# Patient Record
Sex: MALE | Race: WHITE | NOT HISPANIC OR LATINO
[De-identification: names, ages, dates, MRNs, and addresses within clinical notes are randomized per-mention and may not be internally consistent; named-entity substitution may affect disease eponyms.]

---

## 2017-08-08 ENCOUNTER — RECORDS - HEALTHEAST (OUTPATIENT)
Dept: ADMINISTRATIVE | Facility: OTHER | Age: 62
End: 2017-08-08

## 2017-10-04 ENCOUNTER — RECORDS - HEALTHEAST (OUTPATIENT)
Dept: ADMINISTRATIVE | Facility: OTHER | Age: 62
End: 2017-10-04

## 2017-10-05 ENCOUNTER — RECORDS - HEALTHEAST (OUTPATIENT)
Dept: ADMINISTRATIVE | Facility: OTHER | Age: 62
End: 2017-10-05

## 2017-10-06 ENCOUNTER — RECORDS - HEALTHEAST (OUTPATIENT)
Dept: ADMINISTRATIVE | Facility: OTHER | Age: 62
End: 2017-10-06

## 2017-10-09 ENCOUNTER — RECORDS - HEALTHEAST (OUTPATIENT)
Dept: ADMINISTRATIVE | Facility: OTHER | Age: 62
End: 2017-10-09

## 2017-10-10 ENCOUNTER — RECORDS - HEALTHEAST (OUTPATIENT)
Dept: ADMINISTRATIVE | Facility: OTHER | Age: 62
End: 2017-10-10

## 2017-10-12 ENCOUNTER — RECORDS - HEALTHEAST (OUTPATIENT)
Dept: ADMINISTRATIVE | Facility: OTHER | Age: 62
End: 2017-10-12

## 2017-10-24 ENCOUNTER — RECORDS - HEALTHEAST (OUTPATIENT)
Dept: ADMINISTRATIVE | Facility: OTHER | Age: 62
End: 2017-10-24

## 2017-10-24 ENCOUNTER — AMBULATORY - HEALTHEAST (OUTPATIENT)
Dept: RADIATION ONCOLOGY | Age: 62
End: 2017-10-24

## 2017-10-24 DIAGNOSIS — C79.31 BRAIN METASTASIS: ICD-10-CM

## 2017-10-26 ENCOUNTER — RECORDS - HEALTHEAST (OUTPATIENT)
Dept: ADMINISTRATIVE | Facility: OTHER | Age: 62
End: 2017-10-26

## 2017-10-30 ENCOUNTER — COMMUNICATION - HEALTHEAST (OUTPATIENT)
Dept: TELEHEALTH | Facility: CLINIC | Age: 62
End: 2017-10-30

## 2017-10-30 ENCOUNTER — HOSPITAL ENCOUNTER (OUTPATIENT)
Dept: CT IMAGING | Facility: CLINIC | Age: 62
Setting detail: RADIATION/ONCOLOGY SERIES
Discharge: STILL ADMITTED OR ADMINISTRATIVE | End: 2017-10-30
Attending: RADIOLOGY

## 2017-10-30 ENCOUNTER — OFFICE VISIT - HEALTHEAST (OUTPATIENT)
Dept: RADIATION ONCOLOGY | Age: 62
End: 2017-10-30

## 2017-10-30 ENCOUNTER — HOSPITAL ENCOUNTER (OUTPATIENT)
Dept: MRI IMAGING | Facility: CLINIC | Age: 62
Discharge: HOME | End: 2017-10-30
Attending: RADIOLOGY

## 2017-10-30 DIAGNOSIS — C79.31 BRAIN METASTASES: ICD-10-CM

## 2017-10-30 DIAGNOSIS — C79.31 BRAIN METASTASIS: ICD-10-CM

## 2017-10-30 RX ORDER — ACETAMINOPHEN 500 MG
500-1000 TABLET ORAL EVERY 6 HOURS PRN
Status: SHIPPED | COMMUNITY
Start: 2017-10-06

## 2017-10-30 RX ORDER — HYDROCODONE BITARTRATE AND ACETAMINOPHEN 5; 325 MG/1; MG/1
1-2 TABLET ORAL EVERY 4 HOURS PRN
Status: SHIPPED | COMMUNITY
Start: 2017-10-24

## 2017-10-30 RX ORDER — DEXAMETHASONE 2 MG/1
2 TABLET ORAL 2 TIMES DAILY WITH MEALS
Qty: 60 TABLET | Refills: 0 | Status: SHIPPED | OUTPATIENT
Start: 2017-10-30

## 2017-10-30 RX ORDER — ALLOPURINOL 300 MG/1
300 TABLET ORAL DAILY
Status: SHIPPED | COMMUNITY
Start: 2017-10-30

## 2017-10-30 RX ORDER — ALBUTEROL SULFATE 90 UG/1
2 AEROSOL, METERED RESPIRATORY (INHALATION) EVERY 4 HOURS PRN
Status: SHIPPED | COMMUNITY
Start: 2017-03-30

## 2017-10-30 ASSESSMENT — MIFFLIN-ST. JEOR: SCORE: 1978.78

## 2017-10-31 ENCOUNTER — AMBULATORY - HEALTHEAST (OUTPATIENT)
Dept: RADIATION ONCOLOGY | Facility: HOSPITAL | Age: 62
End: 2017-10-31

## 2017-10-31 DIAGNOSIS — C79.31 BRAIN METASTASES: ICD-10-CM

## 2017-11-02 ENCOUNTER — AMBULATORY - HEALTHEAST (OUTPATIENT)
Dept: RADIATION ONCOLOGY | Age: 62
End: 2017-11-02

## 2017-11-03 ENCOUNTER — OFFICE VISIT - HEALTHEAST (OUTPATIENT)
Dept: RADIATION ONCOLOGY | Age: 62
End: 2017-11-03

## 2017-11-03 ENCOUNTER — AMBULATORY - HEALTHEAST (OUTPATIENT)
Dept: RADIATION ONCOLOGY | Age: 62
End: 2017-11-03

## 2017-11-03 DIAGNOSIS — C64.9 CLEAR CELL ADENOCARCINOMA OF KIDNEY, UNSPECIFIED LATERALITY: ICD-10-CM

## 2017-11-03 DIAGNOSIS — C79.31 BRAIN METASTASIS: ICD-10-CM

## 2017-11-03 DIAGNOSIS — C79.31 BRAIN METASTASES: ICD-10-CM

## 2017-11-13 ENCOUNTER — COMMUNICATION - HEALTHEAST (OUTPATIENT)
Dept: RADIATION ONCOLOGY | Age: 62
End: 2017-11-13

## 2017-11-15 ENCOUNTER — COMMUNICATION - HEALTHEAST (OUTPATIENT)
Dept: RADIATION ONCOLOGY | Age: 62
End: 2017-11-15

## 2018-10-17 ENCOUNTER — COMMUNICATION - HEALTHEAST (OUTPATIENT)
Dept: RADIATION ONCOLOGY | Facility: HOSPITAL | Age: 63
End: 2018-10-17

## 2021-05-31 VITALS — WEIGHT: 264.2 LBS | BODY MASS INDEX: 39.13 KG/M2 | HEIGHT: 69 IN

## 2021-06-13 NOTE — PROGRESS NOTES
[facility] Radiation Oncology Consult Note    Patient: Jeramy Carson  MRN: [id number]  Date of Service: 10/30/2017    Assessment / Impression     1. Brain metastases               Plan:   Patient to meet with Dr. Reina and complete CT Simulation and MRI today for treatment planning.   Order in to refill dexamethasone. Benadryl to help with rash.    Genetic testing due to multiple tumors both with patient and his family.     Face to face time  60 minutes with > 75% spent on consultation, education and coordination of care.  Intent of Therapy: Palliative  Side effects that may occur during or within weeks after Radiation Therapy      Fatigue and general weakness    Headache and scalp irritation    Loss of hair    Nausea, vomiting, and decrease in appetite    Darkening, irritation, itchiness, redness, dryness, peeling, thickening, scabbing, and ulceration of the scalp, neck and forehead    Side effects that may occur months or years after Radiation Therapy      Development of another tumor or cancer           Dizziness and balance problems    Decrease in hormone production    Brain inflammation or necrosis that may cause various neurologic symptoms    Seizures    Decrease in memory and thinking abilities    Decrease in hearing and feeling of ear congestion    Eye dryness and irritation    Loss of vision    Cataracts in the eyes    Poor healing after a trauma or surgery in the irradiated area    Facial numbness, pain and weakness    The risks, benefits and alternatives to radiation therapy were outlined with the patient. All questions were answered and a consent was signed.        Subjective:      HPI: Jeramy Carson is a 62 y.o. male referred for CyberKnife treatment for metastatic renal carcinoma to the left occipital s/p resection 10/9/2017.      4/1/2016 patient presented with new pulmonary nodules in the right upper lobe that were biopsied.  Consistent with well-differentiated neuroendocrine tumor.  8/2017  imaging showed that pulmonary nodules were increasing and patient was started on Octreotide.    Jeramy originally presented to the [facility] room 10/5/2017 after several weeks of blurred vision, subtle confusion, and a feeling that things were not quite right.  CT scan showed a 2 cm left occipital mass with associated edema.  He started Decadron and his condition improved per his son.    Dr. Odom performed a STEALTH-GUIDED LEFT OCCIPITAL CRANIECTOMY, RESECTION OF MASS, MICRODISSECTION, MESH CRANIOPLASTY for a TGR of the tumor.  Path showed metastatic renal cell.  Post operative scan showed complete resection with a Subtle nodular enhancement anterior to the resection cavity, best seen on S12:12-14. Post operative course uneventful.     PET scan showed a abdominal mass measuring 4.3 cm.  This will be biopsied later this week.  He has a substantial family history of cancer which needs to be worked up.        Chief Complaint   Patient presents with     HE Cancer     Prior Radiation: No  Concurrent Chemotherapy: No    Current Outpatient Prescriptions   Medication Sig Dispense Refill     acetaminophen (TYLENOL) 500 MG tablet Take 500-1,000 mg by mouth every 6 (six) hours as needed.       albuterol (PROAIR HFA;PROVENTIL HFA;VENTOLIN HFA) 90 mcg/actuation inhaler Inhale 2 puffs every 4 (four) hours as needed.       allopurinol (ZYLOPRIM) 300 MG tablet Take 300 mg by mouth daily.       dexamethasone (DECADRON) 2 MG tablet Take 2 mg by mouth 2 (two) times a day with meals.        diphenhydrAMINE (BENADRYL) 25 mg tablet Take 25 mg by mouth 2 (two) times a day.       famotidine (PEPCID) 20 MG tablet Take 20 mg by mouth 2 (two) times a day.       HYDROcodone-acetaminophen 5-325 mg per tablet Take 1-2 tablets by mouth every 4 (four) hours as needed.       octreotide (SANDOSTATIN) 20 mg injection Inject 20 mg into the shoulder, thigh, or buttocks every 28 days.       No current facility-administered medications for  this visit.      Past Medical History:   Diagnosis Date     Brain metastasis      Obesity      Obstructive sleep apnea      Renal cell carcinoma      Past Surgical History:   Procedure Laterality Date     BACK SURGERY  2005     CRANIOTOMY  10/2017     NEPHRECTOMY  2015     Review of patient's allergies indicates no known allergies.  History reviewed. No pertinent family history.  Social History     Social History     Marital status: Single     Spouse name: N/A     Number of children: N/A     Years of education: N/A     Occupational History     Not on file.     Social History Main Topics     Smoking status: Never Smoker     Smokeless tobacco: Never Used     Alcohol use Yes      Comment: 4/week     Drug use: No     Sexual activity: Not on file     Other Topics Concern     Not on file     Social History Narrative     No narrative on file        Review of Systems:        General  General (WDL): Exceptions to WDL  Fatigue: Yes - Recent (Less than 3 months)  Weight Loss: Yes - Recent (Greater than 3 months) (20 pounds)  Night Sweats: Yes - Recent (Greater than 3 months)  EENT  ENT (WDL): Exceptions to WDL  Hoarseness: Yes - Recent (Less than 3 months)  Sore Throat: Yes - Recent (Less than 3 months)  Respiratory       Respiratory (WDL): All respiratory elements are within defined limits  Cardiovascular  Cardiovascular (WDL): All cardiovascular elements are within defined limits  Endocrine  Endocrine (WDL): Exceptions to WDL  Hotflashes: Yes -Recent (Less than 3 months)  Gastrointestinal  Gastrointestinal (WDL): Exceptions to WDL  Diarrhea: Yes - Recent (Less than 3 months)  Have had black or tan stools?: Yes - Recent (Less than 3 months)  Musculoskeletal  Musculoskeletal (WDL): All musculoskeletal elements are within defined limits  Integumentary                  Neurological     Psychological/Emotional      Hematological/Lymphatic     Dermatologic     Genitourinary/Reproductive     Reproductive     Pain                "  Accompanied by         Objective:     Physical Exam    Vitals:    10/30/17 0908   BP: 119/83   Pulse: 68   SpO2: 98%   Weight: (!) 264 lb 3.2 oz (119.8 kg)   Height: 5' 9\" (1.753 m)       GENERAL: no acute distress. Cooperative in conversation.   HEENT: pupils are equal, round and reactive. Oromucosa is clean and intact. No ulcerations or mucositis noted. No bleeding noted.   RESP: lungs are clear bilaterally per auscultation. Regular respiratory rate. No wheezes or rhonchi.  CV: Regular, rate and rhythm. No murmurs.  ABD: soft, nontender.  MUSCULOSKELETAL: no palpable points of tenderness   PSYCH: within normal limits. No depression or anxiety.  SKIN: incision dry and intact, macular rash centered around subclavian  LYMPH: no cervical, supraclavicular lymphadenopathy  EXTREMITIES: no edema   NEUROLOGIC: CNII-XII symmetric, normal strength, sensation and reflexes     Throughout, gait normal, visual fields intact     Recent Labs: No results found for this or any previous visit (from the past 168 hour(s)).    Imaging: EXAM:  PET/CT EYES TO THIGHS, CANCER INITIAL STAGING     CLINICAL INFORMATION:  62-year-old male presents with neuroendocrine carcinoma metastatic to   lung, metastatic kidney cancer to the brain, also has carcinoid.     COMPARISON:  CT chest 8/7/2017.  CT abdomen and pelvis 2/2/2017.     TECHNICAL INFORMATION:  After the IV administration of 18 mCi F18-FDG patient rested for   approximately one hour in a sensory deprived environment.  Tomographic images were obtained of   the  neck, chest, abdomen, pelvis and upper thighs (eyes to thighs)  with coronal, axial and   sagittal reformats.  These images were fused with a concurrently acquired computed tomography   (CT) scan for attenuation correction and anatomical localization.      Patient's blood glucose level at the time of injection is 109.     INTERPRETATION:       Average liver SUV: 3.18     Head and Neck:      Physiologic intracranial metabolic " activity is present with some misregistration.     No enlarged cervical lymph node or neck mass is present exhibiting increased FDG uptake.     Chest:      There is apparent minimal increase in size of right middle lobe nodule now measuring 2.2 x 1.7   cm and exhibiting increased FDG uptake with SUV of 11.24.     There is increased size of left upper lobe irregularly-shaped mass now measuring approximately   3.5 cm x 1.9 cm exhibiting FDG uptake with SUV of 11.37.     6 mm soft tissue round structure is present just above the left shoulder exhibiting increased   FDG uptake with SUV of 19.06 (CT image 53).     Physiologic myocardial activity is present.     No enlarged mediastinal, hilar or axillary lymph nodes are present exhibiting increased FDG   uptake.     Abdomen, Pelvis and Proximal Thighs:      Liver is normal in size.  There is a small area at the lateral aspect of the right hepatic lobe   exhibiting markedly increased FDG uptake with SUV of 14.97.     Spleen, pancreas, left adrenal gland and left kidney are normal in size.     There is physiologic left  and GI uptake.     There is a mass measuring 4.5 cm x 3 cm at the left mid abdominal area exhibiting increased FDG   uptake with SUV of 13.36 (CT image 149).  Adjacent mesenteric lymph nodes are present   exhibiting increased FDG uptake with SUV of 8.64 (CT image 145) and 7.54 (CT image 143).     No enlarged retroperitoneal lymph node, mesenteric lymph node or pelvic lymph node is present   exhibiting increased FDG uptake.     There is a small round structure measuring 0.8 x 0.7 cm within the subcutaneous fat lateral to   the right scapula exhibiting increased FDG uptake with SUV of 3.26 (CT image 101).     There is a round structure measuring 1.3 cm x 1.1 cm within the subcutaneous fat of the left   anterior abdominal wall exhibiting increased FDG uptake with SUV of 6.39 (CT image 138).     There is a small round structure measuring 0.8 x 0.6 cm within  the subcutaneous fat of the mid   anterior abdominal wall exhibiting minimal increased FDG uptake with SUV of 1.55 (CT image   174).     There is a round structure measuring 1 cm x 0.7 cm within the subcutaneous fat of the right   side of the anterior abdominal wall exhibiting increased FDG uptake with SUV of 5.92 (CT image   190).     There is a round structure measuring 1 cm x 1 cm at the right inguinal area exhibiting   increased FDG uptake with SUV of 4.53 (CT image 205).     There is a round structure within the subcutaneous fat at the posterior aspect of the left   upper thigh area exhibiting marked increased FDG uptake with SUV of 14.3.     IMPRESSION:     1.  Metabolically active right middle lobe nodule and left upper lobe mass.     2.  Large metabolically active left abdominal mass with adjacent smaller metabolically active   lymph nodes.     3.  Possible metabolically active lesion at the lateral aspect of the right hepatic lobe.    Recommend ultrasound correlation.       4.  Several scattered metabolically active small subcutaneous nodules as described which may   represent metastatic lesions.     RRD:michelle     Read By: Morteza Taylor M.D.     MRI HEAD WITHOUT AND WITH CONTRAST    INDICATION:  assess for residual mass    TECHNIQUE:  Multiplanar, multisequence magnetic resonance imaging of the brain is performed  without and with IV contrast administration.    CONTRAST:  Dotarem 20 mL.    COMPARISON:  Preoperative MRI head 10/05/2017.    FINDINGS:  Postoperative baseline.  Expected interval postoperative changes left occipital  craniectomy and mass resection performed on 10/09/2017.  T1 hyperintensity  along the resection cavity is predominantly nonenhancing and should  predominantly represent blood products.  Subtle nodular enhancement anterior to  the resection cavity, best seen on S12:12-14.  Vasogenic edema similar do  preoperative MRI.  Mild mass effect without substantial midline shift.  No  remote  area of restricted diffusion.  Mild chronic white matter microvascular  ischemic disease.    Major flow voids preserved.  Minimal fluid signal mastoid air cells.  Minimal  scattered paranasal sinus mucosal thickening.    IMPRESSION:  Postoperative baseline. Expected interval postoperative changes left occipital  craniectomy and mass resection performed on 10/09/2017.  Possible minimal  residual disease burden as detailed.  Attention at follow-up.    EXAM:  MRI HEAD WO W CONTRAST    INDICATION:  brain mets.  History of neuroendocrine carcinoma metastatic to lung.    TECHNIQUE:  Multiplanar, multisequence magnetic resonance imaging of the brain is performed  without and with IV contrast administration.    CONTRAST:  Dotarem 20 ml    COMPARISON:  None.    FINDINGS:  Diffusion-weighted images appear normal.  No restricted diffusion.    A peripherally enhancing mass is seen in the left occipital lobe, which on  image 13 of series 12 measures 2.2 cm transverse by 1.9 cm AP.  Longitudinal  dimension is approximately 2.1 cm.  The mass appears to be contiguous with the  left occipital horn.  Enhancing subependymal material extends into the  occipital horn left lateral ventricle, for distance of least 3 cm.  Subependymal spread of malignancy is present.  Extensive vasogenic edema is  seen surrounding the mass, which involves the entire occipital lobe, extending  into the left parietal and temporal lobes.  No other enhancing lesions are  identified.    A few, scattered, small foci of white matter signal abnormality are seen white  matter tracts elsewhere.  Lateral 3rd and 4th ventricles are not enlarged.  There is very slight rightward midline shift, of only about 3 mm.  No  transtentorial or subfalcine herniation.  No hemorrhage.  Brainstem cerebellum  appear normal.    Sella, suprasellar regions appear normal.  No intraorbital abnormalities are  identified.  Paranasal sinuses appear normal.  No abnormal temporal bone  "signal.    IMPRESSION:  1. Enhancing mass left occipital lobe, with extensive adjacent vasogenic edema  as described.  As noted, the mass appears to abut the tip of the occipital  horn, with subependymal spread of malignancy extending into the left occipital  horn.  Dimensions as above.  2. Scattered foci of white matter signal abnormality elsewhere, most likely  small vessel ischemic change.  3. Slight mass effect and rightward midline shift of 3 mm.    Pathology:   CASE: [id number]  PATIENT: CORAL WHITE      SPECIMEN:  A. Left occipital mass #1; B. Left occipital mass #2      CLINICAL HISTORY:  Left occipital mass          DIAGNOSIS:  BRAIN, LEFT OCCIPITAL MASS #1, BIOPSY       --METASTATIC RENAL CELL CARCINOMA, CLEAR CELL TYPE, SEE COMMENT      BRAIN, LEFT OCCIPITAL MASS #2, BIOPSY       --METASTATIC RENAL CELL CARCINOMA, CLEAR CELL TYPE, SEE COMMENT      [id number]      COMMENT: Tumor cell morphology and immunophenotype (positive for PAX-8) are  consistent with a metastatic renal cell carcinoma.  Immunohistochemical  markers for neuroendocrine (synaptophysin and chromogranin) and glial  (GFAP) differentiation are negative.  These results were discussed with Dr. Salas Odom 10/10/17 at 1430.      GROSS:  The specimen in a container labeled \"left occipital mass #1\"  consists of three pink-tan soft tissue fragments, 0.4 to 0.6 cm in greatest  dimension.  Touch preparation is performed and the remainder of the  specimen is entirely submitted in A-1 and A-2.    Signed by: Conchita Marti MD MPH  "

## 2021-06-13 NOTE — PROGRESS NOTES
Pt arrived ambulatory with his son for consultation with Amy Marti & Nuno. Pt was given new patient folder which included welcome letter, MD bio cards, managing fatigue leaflet, Anabella Understanding RT, CK Accuray booklet, and CK pathway. Pathway was explained in detail and pt verbalized their understanding. Pt and son watched CK intracranial video today as well. Pt recently had a craniotomy for metastasis removal, in [place], and his healing well and feeling well since then. Denies headaches or any other neurological changes. They were encouraged to call CK RN line with any questions, concerns, or decisions.    Pt and family were given med education sheet at this time. Ativan prn for tx and sleep. Stay on decadron 2mg BID for now and Dr. Marti will taper post-CK.

## 2021-06-13 NOTE — PROGRESS NOTES
Date of service:   11/3/2017    Radiation oncology procedure    Preoperative diagnosis: Metastatic renal carcinoma to the brain    Postoperative diagnosis: same    Procedure:  Stereotactic CyberKnife therapy.   Simple cranial lesion.      Surgeon:  Deborah Reina M.D., [facility], FA[facility]    Indications:  Jeramy Carson was seen in consultation in the CyberKnife center.  He suffers from metastatic renal carcinoma to the brain.  Complete evaluation was done.  I feltHe was an excellent candidate for CyberKnife radio surgery.  I explained the risks and benefits of this procedure.  The patient wishes to proceed ahead.    Findings: I personally contoured and reviewed the fusion between MRI and CT.  I felt the MRI and CT fusion was excellent.  After contouring, I reviewed the physics plan for delivery of radiation.  The patient was to be treated with a total dose of 2400 in 1 treatment session.    Description: Jeramy Carson came to the CyberKnife center.  He was seen pre-treatment and verification of consent occurred.  The plan was loaded on the console.  Orthogonal x-rays were digitally reconstructed from the images taken during CT sim.  Patient position was verified as accurate after table position shifts occurred.  Treatment commenced and continuous monitoring of table position occurred with corrections as needed.  The patient tolerated the CyberKnife therapy well and left the CyberKnife center in good condition.

## 2021-06-14 NOTE — PROGRESS NOTES
SBRT/SRS OTV Note      Assessment / Impression       1. Brain metastases       No matching staging information was found for the patient.     Tolerating radiation therapy well.  All questions and concerns addressed.    Plan:     Continue radiation treatment as prescribed.  Radiation: Site: L parietal occipital met  Stereotactic Radiosurgery: Yes  Stereotactic Radiosurgery date: 11/03/17  Concurrent Therapy: No (confirmed he's not recently or currently on chemo)  Today's Dose: 2100  Total Dose for Brain: 2100  Today's Fraction/Total Fraction Brain: 1/1      Subjective:      HPI: Jeramy Carson is a 62 y.o. male with    1. Brain metastases         The following portions of the patient's history were reviewed and updated as appropriate: allergies, current medications, past family history, past medical history, past social history, past surgical history and problem list.      Objective:     Exam:   Vitals:    11/03/17 1205   BP: 109/71   Pulse: 68   SpO2: 98%       Wt Readings from Last 8 Encounters:   10/30/17 (!) 264 lb 3.2 oz (119.8 kg)       General: Alert and oriented, in no acute distress  Jeramy has no Erythema.    Treatment Summary to Date    Aria chart and setup information reviewed    Jeramy Lopez MD

## 2021-06-16 PROBLEM — C7A.8 NEUROENDOCRINE CANCER (H): Status: ACTIVE | Noted: 2017-01-31

## 2021-06-16 PROBLEM — C7A.8: Status: ACTIVE | Noted: 2017-08-10

## 2021-06-16 PROBLEM — C7B.8: Status: ACTIVE | Noted: 2017-08-10

## 2021-06-16 PROBLEM — G93.89 BRAIN MASS: Status: ACTIVE | Noted: 2017-10-09

## 2023-04-03 PROBLEM — C79.31 MALIGNANT NEOPLASM METASTATIC TO BRAIN (H): Status: ACTIVE | Noted: 2017-10-05
